# Patient Record
Sex: FEMALE | Race: WHITE | NOT HISPANIC OR LATINO | Employment: FULL TIME | URBAN - METROPOLITAN AREA
[De-identification: names, ages, dates, MRNs, and addresses within clinical notes are randomized per-mention and may not be internally consistent; named-entity substitution may affect disease eponyms.]

---

## 2023-11-30 ENCOUNTER — OFFICE VISIT (OUTPATIENT)
Age: 48
End: 2023-11-30
Payer: COMMERCIAL

## 2023-11-30 VITALS
HEART RATE: 92 BPM | HEIGHT: 62 IN | WEIGHT: 154.32 LBS | OXYGEN SATURATION: 96 % | TEMPERATURE: 96.4 F | RESPIRATION RATE: 18 BRPM | SYSTOLIC BLOOD PRESSURE: 138 MMHG | BODY MASS INDEX: 28.4 KG/M2 | DIASTOLIC BLOOD PRESSURE: 92 MMHG

## 2023-11-30 DIAGNOSIS — T78.40XA ALLERGIC REACTION, INITIAL ENCOUNTER: Primary | ICD-10-CM

## 2023-11-30 PROCEDURE — 99203 OFFICE O/P NEW LOW 30 MIN: CPT | Performed by: PHYSICIAN ASSISTANT

## 2023-11-30 PROCEDURE — 96372 THER/PROPH/DIAG INJ SC/IM: CPT | Performed by: PHYSICIAN ASSISTANT

## 2023-11-30 RX ORDER — DEXAMETHASONE SODIUM PHOSPHATE 4 MG/ML
8 INJECTION, SOLUTION INTRA-ARTICULAR; INTRALESIONAL; INTRAMUSCULAR; INTRAVENOUS; SOFT TISSUE ONCE
Status: COMPLETED | OUTPATIENT
Start: 2023-11-30 | End: 2023-11-30

## 2023-11-30 RX ADMIN — DEXAMETHASONE SODIUM PHOSPHATE 8 MG: 4 INJECTION, SOLUTION INTRA-ARTICULAR; INTRALESIONAL; INTRAMUSCULAR; INTRAVENOUS; SOFT TISSUE at 09:29

## 2023-11-30 NOTE — PATIENT INSTRUCTIONS
General Allergic Reaction   WHAT YOU NEED TO KNOW:   An allergic reaction is your body's response to an allergen. Allergens include medicines, food, insect stings, animal dander, mold, latex, chemicals, and dust mites. Pollen from trees, grass, and weeds can also cause an allergic reaction. An allergic reaction can range from mild to severe. DISCHARGE INSTRUCTIONS:   Call 911 for signs or symptoms of anaphylaxis,  such as trouble breathing, swelling in your mouth or throat, or wheezing. You may also have itching, a rash, hives, or feel like you are going to faint. Return to the emergency department if:   You have a skin rash, hives, swelling, or itching that is starting to get worse. Your throat tightens, or your lips or tongue swell. You have trouble swallowing or speaking. You have worsening nausea, diarrhea, or abdominal cramps, or you are vomiting. You have chest pain or tightness. Contact your healthcare provider if:   You have questions or concerns about your condition or care. Medicines: You may need any of the following:  Medicines  may be given to relieve certain allergy symptoms such as itching, sneezing, and swelling. You may take them as a pill or use drops in your nose or eyes. Topical treatments may be given to put directly on your skin to help decrease itching or swelling. Epinephrine  may be prescribed if you are at risk for anaphylaxis. This is a severe allergic reaction that can be life-threatening. Your healthcare provider will tell you if you need to keep epinephrine with you. You will be taught when and how to use it. Take your medicine as directed. Contact your healthcare provider if you think your medicine is not helping or if you have side effects. Tell your provider if you are allergic to any medicine. Keep a list of the medicines, vitamins, and herbs you take. Include the amounts, and when and why you take them.  Bring the list or the pill bottles to follow-up visits. Carry your medicine list with you in case of an emergency. Follow up with your doctor as directed:  Write down your questions so you remember to ask them during your visits. Manage your symptoms:   Avoid allergens. You may need to have allergy testing with your healthcare provider or a specialist to find your allergens. Use cold compresses on your skin or eyes. This will help soothe skin or eyes affected by the allergic reaction. You can make a cold compress by soaking a washcloth in cool water. Wring out the extra water before you apply the washcloth. Rinse your nasal passages with a saline solution. Daily rinsing may help clear allergens out of your nose. Use distilled water if possible. You can also boil tap water and then let it cool before you use it. Do not use tap water without boiling it first.    Do not smoke. Nicotine and other chemicals in cigarettes and cigars can make an allergic reaction worse, and can also cause lung damage. Ask your healthcare provider for information if you currently smoke and need help to quit. E-cigarettes or smokeless tobacco still contain nicotine. Talk to your healthcare provider before you use these products. © Copyright Kami Christianson 2023 Information is for End User's use only and may not be sold, redistributed or otherwise used for commercial purposes. The above information is an  only. It is not intended as medical advice for individual conditions or treatments. Talk to your doctor, nurse or pharmacist before following any medical regimen to see if it is safe and effective for you.

## 2023-12-04 NOTE — PROGRESS NOTES
[facility]        NAME: Trupti Skaggs is a 51 y.o. female  : 1975    MRN: [id number]  DATE: 2023  TIME: 12:08 PM    Assessment and Plan   Allergic reaction, initial encounter Yaneth Cotter  1. Allergic reaction, initial encounter  dexamethasone (DECADRON) injection 8 mg            Patient Instructions     Pt has what appears to be an acute allergic reaction due to using a new moisturizer. She has no sign of angioedema or respiratory distress. She was given a Decadron 8 mg injection and recommended over-the-counter topical and oral antihistamines with close observation. Should be reevaluated if condition persist, worsens, or recurs despite treatment and she should avoid moisturizer that caused the reaction going forward. Follow up with PCP in 3-5 days. Proceed to  ER if symptoms worsen. Chief Complaint     Chief Complaint   Patient presents with    Allergic Reaction     Tuesday patient had derma planing done and was advised to use a new Moisturizer. Immediately had itching and burning and washed it off with water but now presents red and blotchy on face. History of Present Illness       Patient presents with what she believes is an allergic reaction to a new moisturizer that she used following a dermatologic procedure. She reports she now has a red, blotchy, itchy and burning rash on her face. She denies any difficulty breathing or swallowing or any swelling of her lips, tongue, or throat. She denies any other recent new exposures. Allergic Reaction  Associated symptoms include a rash. Review of Systems   Review of Systems   Constitutional: Negative. HENT: Negative. Respiratory: Negative. Cardiovascular: Negative. Gastrointestinal: Negative. Genitourinary: Negative. Skin:  Positive for rash. Current Medications     No current outpatient medications on file.     Current Allergies     Allergies as of 2023    (No Known Allergies) The following portions of the patient's history were reviewed and updated as appropriate: allergies, current medications, past family history, past medical history, past social history, past surgical history and problem list.     History reviewed. No pertinent past medical history. Past Surgical History:   Procedure Laterality Date    THYROID SURGERY         History reviewed. No pertinent family history. Medications have been verified. Objective   /92   Pulse 92   Temp (!) 96.4 °F (35.8 °C)   Resp 18   Ht 5' 2" (1.575 m)   Wt 70 kg (154 lb 5.2 oz)   LMP 11/13/2023 (Approximate)   SpO2 96%   BMI 28.23 kg/m²   Patient's last menstrual period was 11/13/2023 (approximate). Physical Exam     Physical Exam  Vitals reviewed. Constitutional:       General: She is not in acute distress. Appearance: She is well-developed. HENT:      Mouth/Throat:      Mouth: No angioedema. Cardiovascular:      Rate and Rhythm: Normal rate and regular rhythm. Pulses: Normal pulses. Heart sounds: Normal heart sounds. No murmur heard. Pulmonary:      Effort: Pulmonary effort is normal. No respiratory distress. Breath sounds: Normal breath sounds. Skin:     Findings: Rash (Blotchy erythematous macular rash across the face with mild soft tissue swelling but there is no wheal formation. No periorbital edema) present. Neurological:      Mental Status: She is alert and oriented to person, place, and time.

## 2024-07-27 ENCOUNTER — HOSPITAL ENCOUNTER (EMERGENCY)
Facility: HOSPITAL | Age: 49
Discharge: HOME | End: 2024-07-27
Attending: EMERGENCY MEDICINE
Payer: COMMERCIAL

## 2024-07-27 VITALS
BODY MASS INDEX: 29.31 KG/M2 | TEMPERATURE: 98.6 F | RESPIRATION RATE: 16 BRPM | WEIGHT: 160.27 LBS | DIASTOLIC BLOOD PRESSURE: 84 MMHG | HEART RATE: 83 BPM | SYSTOLIC BLOOD PRESSURE: 148 MMHG | OXYGEN SATURATION: 99 %

## 2024-07-27 DIAGNOSIS — Z29.14 ENCOUNTER FOR PROPHYLACTIC ADMINISTRATION OF RABIES IMMUNE GLOBULIN: Primary | ICD-10-CM

## 2024-07-27 PROCEDURE — 90675 RABIES VACCINE IM: CPT | Performed by: PHYSICIAN ASSISTANT

## 2024-07-27 PROCEDURE — 99283 EMERGENCY DEPT VISIT LOW MDM: CPT

## 2024-07-27 PROCEDURE — 96372 THER/PROPH/DIAG INJ SC/IM: CPT

## 2024-07-27 PROCEDURE — 99283 EMERGENCY DEPT VISIT LOW MDM: CPT | Performed by: PHYSICIAN ASSISTANT

## 2024-07-27 PROCEDURE — 90471 IMMUNIZATION ADMIN: CPT

## 2024-07-27 PROCEDURE — 90375 RABIES IG IM/SC: CPT | Performed by: PHYSICIAN ASSISTANT

## 2024-07-27 RX ADMIN — RABIES VIRUS STRAIN PM-1503-3M ANTIGEN (PROPIOLACTONE INACTIVATED) AND WATER 1 ML: KIT at 16:55

## 2024-07-27 RX ADMIN — RABIES IMMUNE GLOBULIN (HUMAN) 1500 UNITS: 300 INJECTION, SOLUTION INFILTRATION; INTRAMUSCULAR at 16:56

## 2024-07-27 NOTE — DISCHARGE INSTRUCTIONS
Please refer to the attached information for strict return instructions. If symptoms worsen or new symptoms develop please return to the ER. Please follow up at urgent care on days 3, 7, and 14 for repeat rabies vaccination (7/30, 8/3, and 8/10).

## 2024-07-27 NOTE — ED PROVIDER NOTES
History  Chief Complaint   Patient presents with    Medical Problem     Pt with poss rabies exposure due to dog with fight with prince.,      Aleks is a 47 yo F presenting for evaluation of possible rabies exposure. She reports she was in her home when a prince ran out of an adjacent corn field and began attacking her dog biting it. They  and patient was able to bring the dog into the house. The prince subsequently attacked one of their cats before running off. The patient did not have specific cuts/wounds and was not bit, although does report her animals were bleeding and notes it was very chaotic, so she cannot guarantee there was no mucus membrane exposure to blood. She has not been previously vaccinated for rabies.      History provided by:  Patient   used: No        None       No past medical history on file.    Past Surgical History:   Procedure Laterality Date    THYROID SURGERY         No family history on file.  I have reviewed and agree with the history as documented.    E-Cigarette/Vaping    E-Cigarette Use Never User      E-Cigarette/Vaping Substances    Nicotine No     THC No     Flavoring No      Social History     Tobacco Use    Smoking status: Never    Smokeless tobacco: Never   Vaping Use    Vaping status: Never Used   Substance Use Topics    Alcohol use: Yes    Drug use: Never       Review of Systems   Constitutional:  Negative for chills and fever.   HENT:  Negative for congestion, rhinorrhea and sore throat.    Eyes:  Negative for pain and visual disturbance.   Respiratory:  Negative for cough, shortness of breath and wheezing.    Cardiovascular:  Negative for chest pain and palpitations.   Gastrointestinal:  Negative for abdominal pain, nausea and vomiting.   Genitourinary:  Negative for dysuria, frequency and urgency.   Musculoskeletal:  Negative for back pain, neck pain and neck stiffness.   Skin:  Negative for rash and wound.   Neurological:  Negative for dizziness,  weakness, light-headedness and numbness.       Physical Exam  Physical Exam  Constitutional:       General: She is not in acute distress.     Appearance: She is well-developed. She is not diaphoretic.   HENT:      Head: Normocephalic and atraumatic.      Right Ear: External ear normal.      Left Ear: External ear normal.   Eyes:      Extraocular Movements:      Right eye: Normal extraocular motion.      Left eye: Normal extraocular motion.      Conjunctiva/sclera: Conjunctivae normal.      Pupils: Pupils are equal, round, and reactive to light.   Cardiovascular:      Rate and Rhythm: Normal rate and regular rhythm.   Pulmonary:      Effort: Pulmonary effort is normal. No accessory muscle usage or respiratory distress.   Abdominal:      General: Abdomen is flat. There is no distension.   Musculoskeletal:      Cervical back: Normal range of motion. No rigidity.   Skin:     General: Skin is warm and dry.      Capillary Refill: Capillary refill takes less than 2 seconds.      Findings: No erythema or rash.   Neurological:      Mental Status: She is alert and oriented to person, place, and time.      Motor: No abnormal muscle tone.      Coordination: Coordination normal.   Psychiatric:         Behavior: Behavior normal.         Thought Content: Thought content normal.         Judgment: Judgment normal.         Vital Signs  ED Triage Vitals   Temperature Pulse Respirations Blood Pressure SpO2   07/27/24 1525 07/27/24 1524 07/27/24 1524 07/27/24 1524 07/27/24 1524   98.6 °F (37 °C) 83 16 148/84 99 %      Temp Source Heart Rate Source Patient Position - Orthostatic VS BP Location FiO2 (%)   07/27/24 1525 -- 07/27/24 1524 -- --   Oral  Sitting        Pain Score       --                  Vitals:    07/27/24 1524   BP: 148/84   Pulse: 83   Patient Position - Orthostatic VS: Sitting         Visual Acuity      ED Medications  Medications   rabies vaccine, human diploid IM injection 1 mL (1 mL Intramuscular Given 7/27/24 1655)    rabies immune globulin, human (HyperRAB) injection 1,500 Units (1,500 Units Infiltration Given 7/27/24 1656)       Diagnostic Studies  Results Reviewed       None                   No orders to display              Procedures  Procedures         ED Course                                 SBIRT 20yo+      Flowsheet Row Most Recent Value   Initial Alcohol Screen: US AUDIT-C     1. How often do you have a drink containing alcohol? 3 Filed at: 07/27/2024 1546   2. How many drinks containing alcohol do you have on a typical day you are drinking?  3 Filed at: 07/27/2024 1546   3b. FEMALE Any Age, or MALE 65+: How often do you have 4 or more drinks on one occassion? 3 Filed at: 07/27/2024 1546   Audit-C Score 9 Filed at: 07/27/2024 1546   Full Alcohol Screen: US AUDIT    4. How often during the last year have you found that you were not able to stop drinking once you had started? 0 Filed at: 07/27/2024 1546   5. How often during past year have you failed to do what was normally expected of you because of drinking?  0 Filed at: 07/27/2024 1546   6. How often in past year have you needed a first drink in the morning to get yourself going after a heavy drinking session?  0 Filed at: 07/27/2024 1546   7. How often in past year have you had feeling of guilt or remorse after drinking?  0 Filed at: 07/27/2024 1546   8. How often in past year have you been unable to remember what happened night before because you had been drinking?  0 Filed at: 07/27/2024 1546   9. Have you or someone else been injured as a result of your drinking?  0 Filed at: 07/27/2024 1546   10. Has a relative, friend, doctor or other health worker been concerned about your drinking and suggested you cut down?  0 Filed at: 07/27/2024 1546   AUDIT Total Score 9 Filed at: 07/27/2024 1546   CRISTIN: How many times in the past year have you...    Used an illegal drug or used a prescription medication for non-medical reasons? Never Filed at: 07/27/2024 1546                       Medical Decision Making  Patient presenting after possible rabies exposure given prince attacking her animals, although no specific wounds recalled. Given concern for mucus membrane exposure given bleeding/wounds from her animals will provide rabies series. Given immune globulin/vaccination here, counseled on f/u timing for repeat rabies vaccine administration at urgent care.     Risk  Prescription drug management.                 Disposition  Final diagnoses:   Encounter for prophylactic administration of rabies immune globulin     Time reflects when diagnosis was documented in both MDM as applicable and the Disposition within this note       Time User Action Codes Description Comment    7/27/2024  5:03 PM Jonny Angel Add [Z29.14] Encounter for prophylactic administration of rabies immune globulin           ED Disposition       ED Disposition   Discharge    Condition   Stable    Date/Time   Sat Jul 27, 2024 1703    Comment   Aleks Lopez discharge to home/self care.                   Follow-up Information       Follow up With Specialties Details Why Contact Info Additional Information    [facility]  Follow up on days 3, 7, and 14 for repeat rabies vaccination (7/30, 8/3, and 8/10). [address]  [place] [address]  [phone] [facility] [phone]     Via the [address] the [place] [address] (North/South) Take [address] toward [place]. Take the [facility] [address]. Keep right and follow signs for [address]/ [place]. Merge onto [address]. In a half mile, take the exit for [address] toward [place]. In 0.7 miles take the [facility] Exit. Merge onto [facility]. In 500 feet, turn left on [address] and drive 0.3 miles. [facility] will be on your left.    [address]ia [address] ([address]) Take [address] toward [place]. Take the [facility] Exit. Merge  onto [facility]. In 500 feet, turn left on [address] and drive 0.3 miles. [facility] will be on your left.  [address]ia [address] (East/West) Take [address] towards [place]. In 0.7 miles take the [facility] Exit. Merge onto [facility]. In 500 feet, turn left on [address] and drive 0.3 miles. [facility] will be on your left.            Patient's Medications    No medications on file       No discharge procedures on file.    PDMP Review       None            ED Provider  Electronically Signed by             Jonny Angel PA-C  07/27/24 4928

## 2024-07-30 ENCOUNTER — OFFICE VISIT (OUTPATIENT)
Age: 49
End: 2024-07-30
Payer: COMMERCIAL

## 2024-07-30 VITALS
DIASTOLIC BLOOD PRESSURE: 64 MMHG | OXYGEN SATURATION: 99 % | TEMPERATURE: 98.8 F | SYSTOLIC BLOOD PRESSURE: 102 MMHG | HEART RATE: 81 BPM | HEIGHT: 62 IN | WEIGHT: 160 LBS | BODY MASS INDEX: 29.44 KG/M2 | RESPIRATION RATE: 17 BRPM

## 2024-07-30 DIAGNOSIS — Z23 NEED FOR PROPHYLACTIC VACCINATION AND INOCULATION AGAINST RABIES: Primary | ICD-10-CM

## 2024-07-30 PROCEDURE — 99213 OFFICE O/P EST LOW 20 MIN: CPT

## 2024-07-30 PROCEDURE — 90675 RABIES VACCINE IM: CPT

## 2024-07-30 PROCEDURE — 90471 IMMUNIZATION ADMIN: CPT

## 2024-07-30 NOTE — PROGRESS NOTES
[facility]        NAME: Aleks Lopez is a 48 y.o. female  : 1975    MRN: [id number]  DATE: 2024  TIME: 10:33 AM    Assessment and Plan   Need for prophylactic vaccination and inoculation against rabies [Z23]  1. Need for prophylactic vaccination and inoculation against rabies  Rabies vaccine IM (human diploid, IMOVAX)            Patient Instructions   Return for next Rabies vaccine as outlined on vaccination plan.    Chief Complaint     Chief Complaint   Patient presents with   • Follow Up Rabies     Denies reaction from vaccine. Denies sx of rabies. Denies new onset of fevers or chills.          History of Present Illness       A prince was attacking her dog and cat several days ago and she tried to break them up. Was seen in ED 3 days ago for first series. Denies reaction from first vaccination in this series from 3 days ago.        Review of Systems   Review of Systems   Constitutional:  Negative for chills and fever.   HENT:  Negative for ear pain and sore throat.    Eyes:  Negative for pain and visual disturbance.   Respiratory:  Negative for cough and shortness of breath.    Cardiovascular:  Negative for chest pain and palpitations.   Gastrointestinal:  Negative for abdominal pain and vomiting.   Genitourinary:  Negative for dysuria and hematuria.   Musculoskeletal:  Negative for arthralgias and back pain.   Skin:  Negative for color change and rash.   Neurological:  Negative for dizziness, seizures, syncope, weakness and numbness.   All other systems reviewed and are negative.        Current Medications     No current outpatient medications on file.    Current Allergies     Allergies as of 2024 - Reviewed 2024   Allergen Reaction Noted   • Codeine GI Intolerance 2024            The following portions of the patient's history were reviewed and updated as appropriate: allergies, current medications, past family history, past medical history, past social history, past  "surgical history and problem list.     History reviewed. No pertinent past medical history.    Past Surgical History:   Procedure Laterality Date   • THYROID SURGERY     • THYROID SURGERY         History reviewed. No pertinent family history.      Medications have been verified.        Objective   /64 (BP Location: Left arm, Patient Position: Sitting, Cuff Size: Standard)   Pulse 81   Temp 98.8 °F (37.1 °C) (Oral)   Resp 17   Ht 5' 2\" (1.575 m)   Wt 72.6 kg (160 lb)   LMP 07/16/2024 (Approximate)   SpO2 99%   BMI 29.26 kg/m²   Patient's last menstrual period was 07/16/2024 (approximate).       Physical Exam     Physical Exam  Vitals and nursing note reviewed.   Constitutional:       Appearance: Normal appearance.   HENT:      Head: Normocephalic and atraumatic.   Pulmonary:      Effort: Pulmonary effort is normal.   Skin:     General: Skin is warm and dry.      Capillary Refill: Capillary refill takes less than 2 seconds.   Neurological:      General: No focal deficit present.      Mental Status: She is alert and oriented to person, place, and time. Mental status is at baseline.      Sensory: No sensory deficit.      Motor: No weakness.   Psychiatric:         Mood and Affect: Mood normal.         Behavior: Behavior normal.         Thought Content: Thought content normal.                   "

## 2024-08-03 ENCOUNTER — CLINICAL SUPPORT (OUTPATIENT)
Age: 49
End: 2024-08-03
Payer: COMMERCIAL

## 2024-08-03 VITALS
DIASTOLIC BLOOD PRESSURE: 70 MMHG | WEIGHT: 160 LBS | HEART RATE: 68 BPM | SYSTOLIC BLOOD PRESSURE: 130 MMHG | RESPIRATION RATE: 20 BRPM | TEMPERATURE: 98.3 F | OXYGEN SATURATION: 98 % | HEIGHT: 62 IN | BODY MASS INDEX: 29.44 KG/M2

## 2024-08-03 DIAGNOSIS — Z23 NEED FOR RABIES VACCINATION: Primary | ICD-10-CM

## 2024-08-03 PROCEDURE — 90675 RABIES VACCINE IM: CPT

## 2024-08-03 PROCEDURE — 90471 IMMUNIZATION ADMIN: CPT

## 2024-08-10 ENCOUNTER — CLINICAL SUPPORT (OUTPATIENT)
Age: 49
End: 2024-08-10
Payer: COMMERCIAL

## 2024-08-10 VITALS — TEMPERATURE: 98.7 F

## 2024-08-10 DIAGNOSIS — Z23 NEED FOR RABIES VACCINATION: Primary | ICD-10-CM

## 2024-08-10 PROCEDURE — 90675 RABIES VACCINE IM: CPT

## 2024-08-10 PROCEDURE — 90471 IMMUNIZATION ADMIN: CPT
